# Patient Record
Sex: FEMALE | NOT HISPANIC OR LATINO | Employment: OTHER | URBAN - METROPOLITAN AREA
[De-identification: names, ages, dates, MRNs, and addresses within clinical notes are randomized per-mention and may not be internally consistent; named-entity substitution may affect disease eponyms.]

---

## 2024-01-24 ENCOUNTER — APPOINTMENT (OUTPATIENT)
Dept: RADIOLOGY | Facility: HOSPITAL | Age: 79
End: 2024-01-24
Payer: MEDICARE

## 2024-01-24 ENCOUNTER — HOSPITAL ENCOUNTER (EMERGENCY)
Facility: HOSPITAL | Age: 79
Discharge: HOME | End: 2024-01-24
Attending: STUDENT IN AN ORGANIZED HEALTH CARE EDUCATION/TRAINING PROGRAM
Payer: MEDICARE

## 2024-01-24 VITALS
HEIGHT: 66 IN | OXYGEN SATURATION: 98 % | TEMPERATURE: 97.3 F | SYSTOLIC BLOOD PRESSURE: 123 MMHG | BODY MASS INDEX: 22.18 KG/M2 | WEIGHT: 138 LBS | DIASTOLIC BLOOD PRESSURE: 71 MMHG | HEART RATE: 70 BPM | RESPIRATION RATE: 18 BRPM

## 2024-01-24 DIAGNOSIS — S43.004A DISLOCATION OF RIGHT SHOULDER JOINT, INITIAL ENCOUNTER: Primary | ICD-10-CM

## 2024-01-24 PROCEDURE — 73030 X-RAY EXAM OF SHOULDER: CPT | Mod: RT

## 2024-01-24 PROCEDURE — 73070 X-RAY EXAM OF ELBOW: CPT | Mod: RIGHT SIDE | Performed by: RADIOLOGY

## 2024-01-24 PROCEDURE — 72125 CT NECK SPINE W/O DYE: CPT | Performed by: RADIOLOGY

## 2024-01-24 PROCEDURE — 71045 X-RAY EXAM CHEST 1 VIEW: CPT | Performed by: RADIOLOGY

## 2024-01-24 PROCEDURE — 70450 CT HEAD/BRAIN W/O DYE: CPT

## 2024-01-24 PROCEDURE — 73030 X-RAY EXAM OF SHOULDER: CPT | Mod: RIGHT SIDE | Performed by: RADIOLOGY

## 2024-01-24 PROCEDURE — 73030 X-RAY EXAM OF SHOULDER: CPT | Mod: 76,RT

## 2024-01-24 PROCEDURE — 73070 X-RAY EXAM OF ELBOW: CPT | Mod: RT

## 2024-01-24 PROCEDURE — 70450 CT HEAD/BRAIN W/O DYE: CPT | Performed by: RADIOLOGY

## 2024-01-24 PROCEDURE — 96375 TX/PRO/DX INJ NEW DRUG ADDON: CPT

## 2024-01-24 PROCEDURE — 96374 THER/PROPH/DIAG INJ IV PUSH: CPT

## 2024-01-24 PROCEDURE — 23650 CLTX SHO DSLC W/MNPJ WO ANES: CPT | Performed by: STUDENT IN AN ORGANIZED HEALTH CARE EDUCATION/TRAINING PROGRAM

## 2024-01-24 PROCEDURE — 2500000004 HC RX 250 GENERAL PHARMACY W/ HCPCS (ALT 636 FOR OP/ED): Performed by: STUDENT IN AN ORGANIZED HEALTH CARE EDUCATION/TRAINING PROGRAM

## 2024-01-24 PROCEDURE — 71045 X-RAY EXAM CHEST 1 VIEW: CPT

## 2024-01-24 PROCEDURE — 72125 CT NECK SPINE W/O DYE: CPT

## 2024-01-24 PROCEDURE — 99285 EMERGENCY DEPT VISIT HI MDM: CPT | Performed by: STUDENT IN AN ORGANIZED HEALTH CARE EDUCATION/TRAINING PROGRAM

## 2024-01-24 PROCEDURE — 73030 X-RAY EXAM OF SHOULDER: CPT | Performed by: RADIOLOGY

## 2024-01-24 RX ORDER — FENTANYL CITRATE 50 UG/ML
25 INJECTION, SOLUTION INTRAMUSCULAR; INTRAVENOUS ONCE
Status: COMPLETED | OUTPATIENT
Start: 2024-01-24 | End: 2024-01-24

## 2024-01-24 RX ORDER — LIDOCAINE HYDROCHLORIDE 10 MG/ML
10 INJECTION INFILTRATION; PERINEURAL ONCE
Status: DISCONTINUED | OUTPATIENT
Start: 2024-01-24 | End: 2024-01-24 | Stop reason: HOSPADM

## 2024-01-24 RX ORDER — MIDAZOLAM HYDROCHLORIDE 1 MG/ML
2 INJECTION, SOLUTION INTRAMUSCULAR; INTRAVENOUS ONCE
Status: COMPLETED | OUTPATIENT
Start: 2024-01-24 | End: 2024-01-24

## 2024-01-24 RX ADMIN — FENTANYL CITRATE 25 MCG: 50 INJECTION INTRAMUSCULAR; INTRAVENOUS at 16:22

## 2024-01-24 RX ADMIN — MIDAZOLAM 2 MG: 1 INJECTION INTRAMUSCULAR; INTRAVENOUS at 16:35

## 2024-01-24 ASSESSMENT — COLUMBIA-SUICIDE SEVERITY RATING SCALE - C-SSRS
6. HAVE YOU EVER DONE ANYTHING, STARTED TO DO ANYTHING, OR PREPARED TO DO ANYTHING TO END YOUR LIFE?: NO
2. HAVE YOU ACTUALLY HAD ANY THOUGHTS OF KILLING YOURSELF?: NO
1. IN THE PAST MONTH, HAVE YOU WISHED YOU WERE DEAD OR WISHED YOU COULD GO TO SLEEP AND NOT WAKE UP?: NO

## 2024-01-24 NOTE — ED PROCEDURE NOTE
Procedure  Orthopaedic Injury Treatment - Upper Extremity    Performed by: Aliza Waters DO  Authorized by: Aliza Waters DO    Consent:     Consent obtained:  Verbal    Consent given by:  Patient    Risks, benefits, and alternatives were discussed: yes      Risks discussed:  Fracture, nerve damage, vascular damage, irreducible dislocation and recurrent dislocation    Alternatives discussed:  No treatment  Universal protocol:     Patient identity confirmed:  Verbally with patient  Location:     Location:  Shoulder    Shoulder location:  R shoulder    Shoulder dislocation type: inferior      Chronicity:  New  Pre-procedure details:     Pre-procedure imaging:  X-ray    Imaging findings: dislocation present      Imaging findings: no fracture      Distal perfusion: normal    Sedation:     Sedation type:  Anxiolysis  Anesthesia:     Anesthesia method:  Local infiltration    Local anesthetic:  Lidocaine 1% w/o epi  Procedure details:     Manipulation performed: yes      Shoulder reduction method:  Direct traction, external rotation and scapular manipulation    Reduction successful: yes      Reduction confirmed with imaging: yes      Immobilization:  Sling  Post-procedure details:     Neurological function: normal      Distal perfusion: normal      Range of motion: improved      Procedure completion:  Tolerated               Aliza Waters DO  01/24/24 2268

## 2024-01-24 NOTE — ED PROVIDER NOTES
CC: Fall (PT presents to the ER with right shoulder-elbow pain after she had slipped and fell. Pt did hit her head, denies LOC, and takes a baby asa. MSPs are intact )     HPI:  Patient is a 78-year-old female on daily baby aspirin who presents to the emergency department for a fall.  She was getting out of her 's truck when she slipped on the ice and fell.  She is endorsing right shoulder pain.  She states she did hit the back of her head and had an episode where she saw stars but currently has no neck pain.  Patient only endorsing right shoulder pain.    Records Reviewed:  Recent available ED and inpatient notes reviewed in EMR.    PMHx/PSHx:  Per HPI.   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  - has a past surgical history that includes Other surgical history (02/04/2019); Other surgical history (02/04/2019); and Other surgical history (02/04/2019).  - does not have a problem list on file.    Medications:  Reviewed in EMR. See EMR for complete list of medications and doses.    Allergies:  Macrobid [nitrofurantoin monohyd/m-cryst] and Bactrim [sulfamethoxazole-trimethoprim]    Social History:  - Tobacco:  has no history on file for tobacco use.   - Alcohol:  has no history on file for alcohol use.   - Illicit Drugs:  has no history on file for drug use.     ROS:  Per HPI.       ???????????????????????????????????????????????????????????????  Triage Vitals:  T 36.3 °C (97.3 °F)  HR 60  /80  RR 18  O2 97 %      Physical Exam  ???????????????????????????????????????????????????????????????  GEN: well appearing, no acute distress  HEAD: atraumatic  EYES: PERRL, EOMI, no scleral icterus  ENT: mmm, no rhinorrhea, uvula midline  NECK: no JVD  CVS/CHEST: reg rate, nl rhythm  PULM: CTA b/l no wheezes, crackles, or rhonchi    GI: soft, NT/ND, no rebound or guarding   BACK: no CVA tenderness, no vertebral point tenderness  EXT: no LE edema, 2+ periph pulses in bilat radial and DP   NEURO: Awake and alert, Strength and sensation is equal in b/l upper and lower extremities, normal ambulation, no focal sensory deficits  SKIN: warm, dry, no rashes or ulcerations  PSYCH: AAOx3 answers questions appropriately    Assessment and Plan:  78-year-old female presents the emergency department for mechanical fall.  She has an obvious deformity of her right shoulder.  X-rays obtained and appears to have a inferior shoulder dislocation.  Reduced at bedside with an intra-articular block as well as 25 mcg of fentanyl and 2 mg of Versed.  Patient tolerated the procedure with angiolytics without difficulty.  Given that she hit her head and has a distracting injury a CT of her head and C-spine obtained.  If negative patient will be discharged with orthopedic follow-up.  Repeat x-ray pending postreduction.  A postreduction neurovascular exam obtained and patient has intact sensation and is vascularly intact. CT head and c-spine negative for acute fx but informed on incidentally findings. Encouraged outpatient and ortho follow up. Encouraged to return if she has re-dislocation or severe headache or vomiting. Patient expressed understanding.     ED Course:  Diagnoses as of 01/25/24 1557   Dislocation of right shoulder joint, initial encounter       Social Determinants Limiting Care:  None identified    Disposition:  Discharged in stable condition with return precautions    Aliza Waters DO      Procedures ? [employer] last updated 1/24/2024 4:53 PM        Aliza Waters DO  01/25/24 1558

## 2024-01-24 NOTE — DISCHARGE INSTRUCTIONS
You have a shoulder dislocation today.  It was reduced.  Please follow-up with orthopedic surgeon for reevaluation within 1 week.  Return if your arm goes numb.

## 2024-04-25 ENCOUNTER — OFFICE VISIT (OUTPATIENT)
Dept: OBSTETRICS AND GYNECOLOGY | Facility: CLINIC | Age: 79
End: 2024-04-25
Payer: MEDICARE

## 2024-04-25 VITALS — SYSTOLIC BLOOD PRESSURE: 100 MMHG | WEIGHT: 138 LBS | DIASTOLIC BLOOD PRESSURE: 60 MMHG | BODY MASS INDEX: 22.27 KG/M2

## 2024-04-25 DIAGNOSIS — Z12.31 BREAST CANCER SCREENING BY MAMMOGRAM: ICD-10-CM

## 2024-04-25 DIAGNOSIS — N89.8 VAGINAL ODOR: ICD-10-CM

## 2024-04-25 DIAGNOSIS — Z01.419 WELL WOMAN EXAM: Primary | ICD-10-CM

## 2024-04-25 DIAGNOSIS — N32.81 OAB (OVERACTIVE BLADDER): ICD-10-CM

## 2024-04-25 PROCEDURE — G0101 CA SCREEN;PELVIC/BREAST EXAM: HCPCS | Performed by: MIDWIFE

## 2024-04-25 PROCEDURE — 1159F MED LIST DOCD IN RCRD: CPT | Performed by: MIDWIFE

## 2024-04-25 PROCEDURE — 1036F TOBACCO NON-USER: CPT | Performed by: MIDWIFE

## 2024-04-25 PROCEDURE — 99212 OFFICE O/P EST SF 10 MIN: CPT | Performed by: MIDWIFE

## 2024-04-25 RX ORDER — PSYLLIUM HUSK 3.4 G/5.8G
POWDER ORAL
COMMUNITY
Start: 2019-02-04

## 2024-04-25 RX ORDER — HYDROCORTISONE 25 MG/G
CREAM TOPICAL EVERY 12 HOURS
COMMUNITY
Start: 2016-03-25

## 2024-04-25 RX ORDER — FOLIC ACID 1 MG/1
TABLET ORAL EVERY 24 HOURS
COMMUNITY
Start: 2019-02-04

## 2024-04-25 RX ORDER — MECLIZINE HYDROCHLORIDE 25 MG/1
25 TABLET ORAL EVERY 8 HOURS PRN
COMMUNITY
Start: 2023-08-04

## 2024-04-25 RX ORDER — LISINOPRIL 2.5 MG/1
2.5 TABLET ORAL
COMMUNITY
Start: 2015-09-08

## 2024-04-25 RX ORDER — ASPIRIN 81 MG/1
TABLET ORAL EVERY 24 HOURS
COMMUNITY
Start: 2019-02-04

## 2024-04-25 RX ORDER — CYANOCOBALAMIN (VITAMIN B-12) 500 MCG
TABLET ORAL
COMMUNITY
Start: 2022-08-03

## 2024-04-25 RX ORDER — FAMOTIDINE 20 MG/1
20 TABLET, FILM COATED ORAL
COMMUNITY
Start: 2019-11-07

## 2024-04-25 RX ORDER — METOPROLOL SUCCINATE 25 MG/1
TABLET, EXTENDED RELEASE ORAL EVERY 24 HOURS
COMMUNITY

## 2024-04-25 RX ORDER — CHOLECALCIFEROL (VITAMIN D3) 50 MCG
TABLET ORAL
COMMUNITY
Start: 2019-02-04

## 2024-04-25 RX ORDER — SOLIFENACIN SUCCINATE 10 MG/1
10 TABLET, FILM COATED ORAL DAILY
Qty: 90 TABLET | Refills: 0 | Status: SHIPPED | OUTPATIENT
Start: 2024-04-25 | End: 2024-04-25 | Stop reason: ENTERED-IN-ERROR

## 2024-04-25 RX ORDER — AZELASTINE 1 MG/ML
1 SPRAY, METERED NASAL 2 TIMES DAILY
COMMUNITY
Start: 2023-08-04

## 2024-04-25 RX ORDER — OXYBUTYNIN CHLORIDE 5 MG/1
TABLET ORAL EVERY 24 HOURS
COMMUNITY
Start: 2015-09-01 | End: 2024-04-25 | Stop reason: SDUPTHER

## 2024-04-25 RX ORDER — OXYBUTYNIN CHLORIDE 5 MG/1
5 TABLET ORAL EVERY 24 HOURS
Qty: 90 TABLET | Refills: 3 | Status: SHIPPED | OUTPATIENT
Start: 2024-04-25 | End: 2025-04-25

## 2024-04-25 NOTE — PROGRESS NOTES
"Subjective   Patient ID: Malathi Phillips is a 78 y.o. female who presents for annual exam. And to renew Oxybutinin that has been working well to manage OAB overall but pt has been noticing that she sometimes feels like she is having trouble fully emptying her bladder. Pt also c/o noticing off/on vaginal odor w/o discharge or irritation/itch for the past year.  Pt says constipation is no longer an issue for her since she started taking Flaxseed meal since last visit here.    HPI  PMHx: HEMALATHA; last pap 2013 NIL; 2023 mammogram Neg  SocHx:  \"forever\" no longer SA d/t 's health issues  ROS: no pelvic pain, no dysuria, no flank pain, NAD  Review of Systems   Genitourinary:  Negative for vaginal discharge and vaginal pain.        Vaginal odor       Objective   Physical Exam  Vitals and nursing note reviewed.   Constitutional:       Appearance: Normal appearance.   HENT:      Nose: Nose normal.   Eyes:      Pupils: Pupils are equal, round, and reactive to light.   Neck:      Thyroid: No thyroid mass.   Cardiovascular:      Rate and Rhythm: Normal rate and regular rhythm.   Pulmonary:      Effort: Pulmonary effort is normal.      Breath sounds: Normal breath sounds.   Chest:      Chest wall: No mass.   Breasts:     Right: Inverted nipple present.      Left: Normal.   Abdominal:      Palpations: Abdomen is soft. There is no mass.      Tenderness: There is no abdominal tenderness.   Genitourinary:     Labia:         Right: No rash, tenderness or lesion.         Left: No rash, tenderness or lesion.       Vagina: Normal. No vaginal discharge.      Uterus: Absent.       Comments: Vuvovaginal atrophy  Musculoskeletal:         General: Normal range of motion.   Lymphadenopathy:      Cervical: No cervical adenopathy.      Lower Body: No right inguinal adenopathy. No left inguinal adenopathy.   Skin:     General: Skin is warm and dry.   Neurological:      Mental Status: She is alert and oriented to person, place, and " time.      Motor: Motor function is intact.      Gait: Gait is intact.   Psychiatric:         Mood and Affect: Mood normal.         Assessment/Plan   Diagnoses and all orders for this visit:  Well woman exam  Breast cancer screening by mammogram  -     BI mammo bilateral screening tomosynthesis; Future  Vaginal odor  OAB (overactive bladder)    Reassurance given re exam findings  Pt advised to RTO for consult with Dr Daxa gotti possible urinary retention on Oxybutinin  Vulvar care discussed at length  RTO AE/PRN       FAITH Don-GUILLERMINA, ND 04/25/24 10:21 AM

## 2024-05-22 ENCOUNTER — OFFICE VISIT (OUTPATIENT)
Dept: OBSTETRICS AND GYNECOLOGY | Facility: CLINIC | Age: 79
End: 2024-05-22
Payer: MEDICARE

## 2024-05-22 VITALS
HEIGHT: 66 IN | BODY MASS INDEX: 22.18 KG/M2 | WEIGHT: 138 LBS | SYSTOLIC BLOOD PRESSURE: 112 MMHG | DIASTOLIC BLOOD PRESSURE: 78 MMHG

## 2024-05-22 DIAGNOSIS — R33.9 BLADDER RETENTION: ICD-10-CM

## 2024-05-22 LAB
POC BLOOD, URINE: NEGATIVE
POC GLUCOSE, URINE: NEGATIVE MG/DL
POC LEUKOCYTES, URINE: NEGATIVE
POC NITRITE,URINE: NEGATIVE
POC PROTEIN, URINE: NEGATIVE MG/DL

## 2024-05-22 PROCEDURE — 81002 URINALYSIS NONAUTO W/O SCOPE: CPT | Performed by: OBSTETRICS & GYNECOLOGY

## 2024-05-22 PROCEDURE — 99213 OFFICE O/P EST LOW 20 MIN: CPT | Performed by: OBSTETRICS & GYNECOLOGY

## 2024-05-22 PROCEDURE — 1159F MED LIST DOCD IN RCRD: CPT | Performed by: OBSTETRICS & GYNECOLOGY

## 2024-05-22 PROCEDURE — 1036F TOBACCO NON-USER: CPT | Performed by: OBSTETRICS & GYNECOLOGY

## 2024-05-22 NOTE — PROGRESS NOTES
"Subjective   Patient ID: Malathi Phillips is a 78 y.o. female who presents for bladder concerns.  Review of Nika's note,  FAITH Don-GUILLERMINA, ND   Midwife  Obstetrics and Gynecology     Progress Notes      Addendum     Encounter Date: 4/25/2024    Addendum     Expand All Collapse All       Subjective   Patient ID: Malathi Phillips is a 78 y.o. female who presents for annual exam. And to renew Oxybutinin that has been working well to manage OAB overall but pt has been noticing that she sometimes feels like she is having trouble fully emptying her bladder. Pt also c/o noticing off/on vaginal odor w/o discharge or irritation/itch for the past year.  Pt says constipation is no longer an issue for her since she started taking Flaxseed meal since last visit here.     HPI  PMHx: HEMALATHA; last pap 2013 NIL; 2023 mammogram Neg  SocHx:  \"forever\" no longer SA d/t 's health issues  ROS: no pelvic pain, no dysuria, no flank pain, NAD  Review of Systems   Genitourinary:  Negative for vaginal discharge and vaginal pain.        Vaginal odor               Objective   Physical Exam  Vitals and nursing note reviewed.   Constitutional:       Appearance: Normal appearance.   HENT:      Nose: Nose normal.   Eyes:      Pupils: Pupils are equal, round, and reactive to light.   Neck:      Thyroid: No thyroid mass.   Cardiovascular:      Rate and Rhythm: Normal rate and regular rhythm.   Pulmonary:      Effort: Pulmonary effort is normal.      Breath sounds: Normal breath sounds.   Chest:      Chest wall: No mass.   Breasts:     Right: Inverted nipple present.      Left: Normal.   Abdominal:      Palpations: Abdomen is soft. There is no mass.      Tenderness: There is no abdominal tenderness.   Genitourinary:     Labia:         Right: No rash, tenderness or lesion.         Left: No rash, tenderness or lesion.       Vagina: Normal. No vaginal discharge.      Uterus: Absent.       Comments: Vuvovaginal " atrophy  Musculoskeletal:         General: Normal range of motion.   Lymphadenopathy:      Cervical: No cervical adenopathy.      Lower Body: No right inguinal adenopathy. No left inguinal adenopathy.   Skin:     General: Skin is warm and dry.   Neurological:      Mental Status: She is alert and oriented to person, place, and time.      Motor: Motor function is intact.      Gait: Gait is intact.   Psychiatric:         Mood and Affect: Mood normal.                  Assessment/Plan   Diagnoses and all orders for this visit:  Well woman exam  Breast cancer screening by mammogram  -     BI mammo bilateral screening tomosynthesis; Future  Vaginal odor  OAB (overactive bladder)     Reassurance given re exam findings  Pt advised to RTO for consult with Dr Daxa gotti possible urinary retention on Oxybutinin  Vulvar care discussed at length  RTO AE/PRN     FAITH Don-GUILLERMINA, ND 04/25/24 10:21 AM           Has been on oxybutynin for years.  She is not leaking.  Not wearing a pad.  She wears a pad at night but it is quite dry.  At this point would recommend stopping oxybutynin and seeing the effects as she feels she is having more trouble emptying now then she is leaking    She has a defibrillator in place.  On lisinopril lisinopril metoprolol and baby aspirin.    Pression is possible side effect of oxybutynin.  Discontinue oxybutynin follow-up 1 month.  If the leakage comes back then we can consider other medications        Review of Systems   All other systems reviewed and are negative.      Objective   Physical Exam  Constitutional:       Appearance: Normal appearance. She is normal weight.   Neurological:      Mental Status: She is alert.         Assessment/Plan   Side effects of oxybutynin.  Possible retention.  Not having any leakage.  Has been noted to have a dry mouth at her dentist.  Stop oxybutynin.  Follow-up 1 month         Gregorio Mittal MD 05/22/24 1:34 PM

## 2024-06-26 ENCOUNTER — APPOINTMENT (OUTPATIENT)
Dept: OBSTETRICS AND GYNECOLOGY | Facility: CLINIC | Age: 79
End: 2024-06-26
Payer: MEDICARE

## 2024-06-26 VITALS
BODY MASS INDEX: 23.16 KG/M2 | HEIGHT: 65 IN | SYSTOLIC BLOOD PRESSURE: 108 MMHG | WEIGHT: 139 LBS | DIASTOLIC BLOOD PRESSURE: 68 MMHG

## 2024-06-26 DIAGNOSIS — R32 URINARY INCONTINENCE, UNSPECIFIED TYPE: Primary | ICD-10-CM

## 2024-06-26 PROCEDURE — 99213 OFFICE O/P EST LOW 20 MIN: CPT | Performed by: OBSTETRICS & GYNECOLOGY

## 2024-06-26 PROCEDURE — 1159F MED LIST DOCD IN RCRD: CPT | Performed by: OBSTETRICS & GYNECOLOGY

## 2024-06-26 NOTE — PROGRESS NOTES
Subjective   Patient ID: Malathi Phillips is a 78 y.o. female who presents for Follow-up.  Established patient 78 years old.  Following up after our last visit where she was having trouble emptying while on oxybutynin.  We had her discontinue her oxybutynin.  She is deftly emptying better.  She has had 3 or 4 minor leaks and she is comfortable staying off the medication for now    If her incontinence returns or worsens we can try different classes of medications        Review of Systems   All other systems reviewed and are negative.      Objective   Physical Exam    Assessment/Plan   Follow-up after discontinue oxybutynin.  Overall improved quality of life.  Emptying better.  Minimal leakage .  Is taking calcium.  Wondering if this could be causing some constipation.  Recommend she take 2 Tums daily for her calcium needs.  Stay off of oxybutynin         Gregorio Mittal MD 06/26/24 10:55 AM